# Patient Record
Sex: MALE | Race: WHITE | NOT HISPANIC OR LATINO
[De-identification: names, ages, dates, MRNs, and addresses within clinical notes are randomized per-mention and may not be internally consistent; named-entity substitution may affect disease eponyms.]

---

## 2017-11-15 ENCOUNTER — CHARTING TRANS (OUTPATIENT)
Dept: OTHER | Age: 32
End: 2017-11-15

## 2018-01-08 ENCOUNTER — LAB SERVICES (OUTPATIENT)
Dept: OTHER | Age: 33
End: 2018-01-08

## 2018-01-12 ENCOUNTER — CHARTING TRANS (OUTPATIENT)
Dept: OTHER | Age: 33
End: 2018-01-12

## 2018-01-15 LAB
MOTILITY-GRADE A: 67 %
MOTILITY-GRADE B: 4 %
MOTILITY-GRADE C: 7 %
MOTILITY-GRADE D: 22 %
PERIOD OF ABSTINENCE: ABNORMAL
PROBLEMS WITH COLLECTION: ABNORMAL
SEMEN COLOR: ABNORMAL
SEMEN CONSISTENCY (VISCOSITY): ABNORMAL
SEMEN LIQUEFACTION: ABNORMAL
SEMEN MORPHOLOGY: ABNORMAL
SEMEN PH: 8
SEMEN VOLUME: 2.5 ML
SPERM CONCENTRATION (MIL/ML): 18 MIL/ML
SPERM COUNT (MIL/EJACULATE): 45 MIL/EJACULATE
SPERM VIABILITY: ABNORMAL

## 2018-01-16 ENCOUNTER — CHARTING TRANS (OUTPATIENT)
Dept: OTHER | Age: 33
End: 2018-01-16

## 2018-01-24 ENCOUNTER — LAB SERVICES (OUTPATIENT)
Dept: OTHER | Age: 33
End: 2018-01-24

## 2018-01-25 ENCOUNTER — CHARTING TRANS (OUTPATIENT)
Dept: OTHER | Age: 33
End: 2018-01-25

## 2018-01-29 LAB
MOTILITY-GRADE A: 42 %
MOTILITY-GRADE B: 17 %
MOTILITY-GRADE C: 13 %
MOTILITY-GRADE D: 28 %
PERIOD OF ABSTINENCE: NORMAL
PROBLEMS WITH COLLECTION: NORMAL
SEMEN COLOR: NORMAL
SEMEN CONSISTENCY (VISCOSITY): NORMAL
SEMEN LIQUEFACTION: NORMAL
SEMEN MORPHOLOGY: NORMAL
SEMEN PH: 9
SEMEN VOLUME: 2.5 ML
SPERM CONCENTRATION (MIL/ML): 20.2 MIL/ML
SPERM COUNT (MIL/EJACULATE): 50.5 MIL/EJACULATE
SPERM VIABILITY: NORMAL

## 2018-03-08 ENCOUNTER — CHARTING TRANS (OUTPATIENT)
Dept: OTHER | Age: 33
End: 2018-03-08

## 2018-12-10 ENCOUNTER — DOCUMENTATION (OUTPATIENT)
Dept: OPHTHALMOLOGY | Age: 33
End: 2018-12-10

## 2019-01-26 ENCOUNTER — WALK IN (OUTPATIENT)
Dept: URGENT CARE | Age: 34
End: 2019-01-26

## 2019-01-26 ENCOUNTER — IMAGING SERVICES (OUTPATIENT)
Dept: GENERAL RADIOLOGY | Age: 34
End: 2019-01-26
Attending: FAMILY MEDICINE

## 2019-01-26 DIAGNOSIS — M54.2 NECK PAIN: Primary | ICD-10-CM

## 2019-01-26 DIAGNOSIS — M54.2 NECK PAIN: ICD-10-CM

## 2019-01-26 PROCEDURE — 72050 X-RAY EXAM NECK SPINE 4/5VWS: CPT | Performed by: RADIOLOGY

## 2019-01-26 PROCEDURE — 99204 OFFICE O/P NEW MOD 45 MIN: CPT | Performed by: FAMILY MEDICINE

## 2019-01-26 RX ORDER — FLUTICASONE PROPIONATE 50 MCG
2 SPRAY, SUSPENSION (ML) NASAL
COMMUNITY
Start: 2016-03-29

## 2019-01-26 RX ORDER — FAMOTIDINE 20 MG/1
20 TABLET, FILM COATED ORAL
COMMUNITY
Start: 2014-03-20

## 2019-01-26 RX ORDER — FEXOFENADINE HCL 180 MG/1
180 TABLET ORAL
COMMUNITY
Start: 2014-03-20

## 2019-01-26 RX ORDER — BACLOFEN 10 MG/1
10 TABLET ORAL 3 TIMES DAILY PRN
Qty: 15 TABLET | Refills: 0 | Status: SHIPPED | OUTPATIENT
Start: 2019-01-26 | End: 2019-01-31

## 2019-01-26 ASSESSMENT — PAIN SCALES - GENERAL: PAINLEVEL: 3-4

## 2019-02-05 ENCOUNTER — WALK IN (OUTPATIENT)
Dept: URGENT CARE | Age: 34
End: 2019-02-05

## 2019-02-05 DIAGNOSIS — M54.2 NECK PAIN: Primary | ICD-10-CM

## 2019-02-05 DIAGNOSIS — G44.309 POST-TRAUMATIC HEADACHE, NOT INTRACTABLE, UNSPECIFIED CHRONICITY PATTERN: ICD-10-CM

## 2019-02-05 DIAGNOSIS — R42 VERTIGO: ICD-10-CM
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
## 2019-02-05 PROCEDURE — 99204 OFFICE O/P NEW MOD 45 MIN: CPT | Performed by: PEDIATRICS

## 2019-02-05 ASSESSMENT — PAIN SCALES - GENERAL: PAINLEVEL: 7-8

## 2019-10-16 ENCOUNTER — OFFICE VISIT (OUTPATIENT)
Dept: OPTOMETRY | Age: 34
End: 2019-10-16

## 2019-10-16 DIAGNOSIS — H52.13 MYOPIA OF BOTH EYES: Primary | ICD-10-CM

## 2019-10-16 DIAGNOSIS — H52.223 REGULAR ASTIGMATISM OF BOTH EYES: ICD-10-CM

## 2019-10-16 PROCEDURE — 92015 DETERMINE REFRACTIVE STATE: CPT | Performed by: OPTOMETRIST

## 2019-10-16 PROCEDURE — 92014 COMPRE OPH EXAM EST PT 1/>: CPT | Performed by: OPTOMETRIST

## 2019-10-16 RX ORDER — ESCITALOPRAM OXALATE 10 MG/1
10 TABLET ORAL
COMMUNITY
Start: 2019-09-26

## 2019-10-16 ASSESSMENT — EXTERNAL EXAM - LEFT EYE: OS_EXAM: NORMAL

## 2019-10-16 ASSESSMENT — REFRACTION_WEARINGRX
OD_SPHERE: -6.00
OS_SPHERE: -5.25
OD_AXIS: 135
OD_CYLINDER: +1.25
OS_AXIS: 020
OS_CYLINDER: +1.50

## 2019-10-16 ASSESSMENT — EXTERNAL EXAM - RIGHT EYE: OD_EXAM: NORMAL

## 2019-10-16 ASSESSMENT — KERATOMETRY
OD_K1POWER_DIOPTERS: 43.25
OS_AXISANGLE_DEGREES: 110
OS_K1POWER_DIOPTERS: 43.12
OS_K2POWER_DIOPTERS: 41.87
OD_AXISANGLE2_DEGREES: 150
OD_AXISANGLE_DEGREES: 060
OS_AXISANGLE2_DEGREES: 020
OD_K2POWER_DIOPTERS: 42.50

## 2019-10-16 ASSESSMENT — REFRACTION_MANIFEST
OD_CYLINDER: +1.25
OD_SPHERE: -5.75
OD_AXIS: 140
OS_AXIS: 025
OS_CYLINDER: +1.75
OS_SPHERE: -5.25

## 2019-10-16 ASSESSMENT — TONOMETRY
IOP_METHOD: APPLANATION
OD_IOP_MMHG: 15
OS_IOP_MMHG: 15

## 2019-10-16 ASSESSMENT — CONF VISUAL FIELD
OD_NORMAL: 1
OS_NORMAL: 1

## 2019-10-16 ASSESSMENT — VISUAL ACUITY
CORRECTION_TYPE: GLASSES
OS_CC: J1
OD_CC: J1
OS_CC: 20/20
METHOD: SNELLEN - LINEAR
OD_CC: 20/20

## 2019-10-16 ASSESSMENT — SLIT LAMP EXAM - LIDS
COMMENTS: NORMAL
COMMENTS: NORMAL

## 2019-10-16 ASSESSMENT — CUP TO DISC RATIO
OD_RATIO: 0.6
OS_RATIO: 0.6

## 2019-10-22 ENCOUNTER — OFFICE VISIT (OUTPATIENT)
Dept: DERMATOLOGY | Age: 34
End: 2019-10-22

## 2019-10-22 DIAGNOSIS — Z12.83 SCREENING EXAM FOR SKIN CANCER: ICD-10-CM

## 2019-10-22 DIAGNOSIS — D48.5 NEOPLASM OF UNCERTAIN BEHAVIOR OF SKIN: Primary | ICD-10-CM

## 2019-10-22 PROCEDURE — 99203 OFFICE O/P NEW LOW 30 MIN: CPT | Performed by: DERMATOLOGY

## 2019-10-22 PROCEDURE — 11300 SHAVE SKIN LESION 0.5 CM/<: CPT | Performed by: DERMATOLOGY

## 2019-10-24 ENCOUNTER — TELEPHONE (OUTPATIENT)
Dept: DERMATOLOGY | Age: 34
End: 2019-10-24

## 2019-10-24 ENCOUNTER — TELEPHONE (OUTPATIENT)
Dept: SURGERY | Age: 34
End: 2019-10-24

## 2019-10-24 DIAGNOSIS — D22.9 SPITZ NEVUS: Primary | ICD-10-CM

## 2019-10-24 DIAGNOSIS — D22.5 MELANOCYTIC NEVI OF TRUNK: ICD-10-CM

## 2019-10-24 LAB — PATH REPORT PLASRBC-IMP: NORMAL

## 2019-11-06 ENCOUNTER — OFFICE VISIT (OUTPATIENT)
Dept: SURGERY | Age: 34
End: 2019-11-06

## 2019-11-06 VITALS
BODY MASS INDEX: 23.38 KG/M2 | WEIGHT: 167 LBS | SYSTOLIC BLOOD PRESSURE: 132 MMHG | DIASTOLIC BLOOD PRESSURE: 92 MMHG | TEMPERATURE: 97.3 F | HEIGHT: 71 IN

## 2019-11-06 DIAGNOSIS — D22.9 SPITZ NEVUS: Primary | ICD-10-CM

## 2019-11-06 PROCEDURE — 11601 EXC TR-EXT MAL+MARG 0.6-1 CM: CPT | Performed by: SURGERY

## 2019-11-06 PROCEDURE — 99243 OFF/OP CNSLTJ NEW/EST LOW 30: CPT | Performed by: SURGERY

## 2019-11-08 ASSESSMENT — ENCOUNTER SYMPTOMS
CONSTITUTIONAL NEGATIVE: 1
NEUROLOGICAL NEGATIVE: 1
GASTROINTESTINAL NEGATIVE: 1
ENDOCRINE NEGATIVE: 1
HEMATOLOGIC/LYMPHATIC NEGATIVE: 1
ALLERGIC/IMMUNOLOGIC NEGATIVE: 1
RESPIRATORY NEGATIVE: 1

## 2019-11-11 LAB — AP REPORT: NORMAL

## 2019-11-20 ENCOUNTER — NURSE ONLY (OUTPATIENT)
Dept: SURGERY | Age: 34
End: 2019-11-20

## 2019-11-20 DIAGNOSIS — Z48.02 VISIT FOR SUTURE REMOVAL: Primary | ICD-10-CM

## 2019-11-20 PROCEDURE — 99024 POSTOP FOLLOW-UP VISIT: CPT | Performed by: SURGERY

## 2019-11-22 ENCOUNTER — E-ADVICE (OUTPATIENT)
Dept: OPTOMETRY | Age: 34
End: 2019-11-22

## 2019-12-04 ENCOUNTER — OFFICE VISIT (OUTPATIENT)
Dept: OPTOMETRY | Age: 34
End: 2019-12-04

## 2019-12-04 DIAGNOSIS — H52.13 MYOPIA OF BOTH EYES: Primary | ICD-10-CM

## 2019-12-04 DIAGNOSIS — H52.223 REGULAR ASTIGMATISM OF BOTH EYES: ICD-10-CM

## 2019-12-04 PROCEDURE — X1094 NO CHARGE VISIT: HCPCS | Performed by: OPTOMETRIST

## 2019-12-04 ASSESSMENT — VISUAL ACUITY
METHOD: SNELLEN - LINEAR
OS_CC+: -2
OD_CC: 20/20
CORRECTION_TYPE: GLASSES
OS_CC: 20/20
OD_CC: J1@16
OD_CC+: -2
OS_CC: J1@16

## 2019-12-04 ASSESSMENT — CONF VISUAL FIELD
OD_NORMAL: 1
OS_NORMAL: 1

## 2019-12-04 ASSESSMENT — REFRACTION_MANIFEST
OS_CYLINDER: +1.50
OD_SPHERE: -5.75
OS_SPHERE: -5.25
OD_AXIS: 140
OD_CYLINDER: +1.25
OS_AXIS: 025

## 2019-12-04 ASSESSMENT — REFRACTION_WEARINGRX
OS_CYLINDER: +1.75
OD_AXIS: 140
OD_SPHERE: -5.75
OS_SPHERE: -5.25
OS_AXIS: 025
OD_CYLINDER: +1.25

## 2019-12-05 ENCOUNTER — E-ADVICE (OUTPATIENT)
Dept: OPTOMETRY | Age: 34
End: 2019-12-05

## 2020-02-26 ENCOUNTER — HOSPITAL ENCOUNTER (OUTPATIENT)
Age: 35
Discharge: HOME | End: 2020-02-26
Attending: FAMILY MEDICINE
Payer: COMMERCIAL

## 2020-02-26 ENCOUNTER — APPOINTMENT (OUTPATIENT)
Dept: CT IMAGING | Age: 35
End: 2020-02-26
Attending: FAMILY MEDICINE
Payer: COMMERCIAL

## 2020-02-26 VITALS
TEMPERATURE: 100 F | OXYGEN SATURATION: 97 % | HEART RATE: 99 BPM | SYSTOLIC BLOOD PRESSURE: 140 MMHG | RESPIRATION RATE: 16 BRPM | DIASTOLIC BLOOD PRESSURE: 80 MMHG

## 2020-02-26 DIAGNOSIS — E86.0 DEHYDRATION: Primary | ICD-10-CM

## 2020-02-26 DIAGNOSIS — K52.9 GASTROENTERITIS: ICD-10-CM

## 2020-02-26 LAB
HCT VFR BLD AUTO: 48.1 % (ref 39–53)
HGB BLD-MCNC: 16.6 G/DL (ref 13–17.5)
MCH RBC QN AUTO: 30.2 PG (ref 26–34)
MCHC RBC AUTO-ENTMCNC: 34.5 G/DL (ref 31–37)
MCV RBC AUTO: 87.5 FL (ref 80–100)
PLATELET # BLD AUTO: 222 X10ˆ3/UL (ref 150–450)
POCT BILIRUBIN URINE: NEGATIVE
POCT GLUCOSE URINE: NEGATIVE MG/DL
POCT KETONE URINE: 15 MG/DL
POCT LEUKOCYTE ESTERASE URINE: NEGATIVE
POCT NITRITE URINE: NEGATIVE
POCT PH URINE: 5.5 (ref 5–8)
POCT PROTEIN URINE: NEGATIVE MG/DL
POCT SPECIFIC GRAVITY URINE: 1.03
POCT URINE CLARITY: CLEAR
POCT UROBILINOGEN URINE: 0.2 MG/DL
RBC # BLD AUTO: 5.5 X10ˆ6/UL (ref 4.3–5.7)

## 2020-02-26 PROCEDURE — 81002 URINALYSIS NONAUTO W/O SCOPE: CPT | Performed by: FAMILY MEDICINE

## 2020-02-26 PROCEDURE — 85025 COMPLETE CBC W/AUTO DIFF WBC: CPT | Performed by: FAMILY MEDICINE

## 2020-02-26 PROCEDURE — 96361 HYDRATE IV INFUSION ADD-ON: CPT

## 2020-02-26 PROCEDURE — 96374 THER/PROPH/DIAG INJ IV PUSH: CPT

## 2020-02-26 PROCEDURE — 99205 OFFICE O/P NEW HI 60 MIN: CPT

## 2020-02-26 PROCEDURE — 74176 CT ABD & PELVIS W/O CONTRAST: CPT | Performed by: FAMILY MEDICINE

## 2020-02-26 PROCEDURE — 99204 OFFICE O/P NEW MOD 45 MIN: CPT

## 2020-02-26 RX ORDER — ONDANSETRON 2 MG/ML
4 INJECTION INTRAMUSCULAR; INTRAVENOUS ONCE
Status: COMPLETED | OUTPATIENT
Start: 2020-02-26 | End: 2020-02-26

## 2020-02-26 RX ORDER — ACETAMINOPHEN 500 MG
1000 TABLET ORAL ONCE
Status: COMPLETED | OUTPATIENT
Start: 2020-02-26 | End: 2020-02-26

## 2020-02-26 RX ORDER — ONDANSETRON 4 MG/1
4 TABLET, ORALLY DISINTEGRATING ORAL EVERY 4 HOURS PRN
Qty: 10 TABLET | Refills: 0 | Status: SHIPPED | OUTPATIENT
Start: 2020-02-26 | End: 2020-03-04

## 2020-02-26 RX ORDER — SODIUM CHLORIDE 9 MG/ML
1000 INJECTION, SOLUTION INTRAVENOUS ONCE
Status: COMPLETED | OUTPATIENT
Start: 2020-02-26 | End: 2020-02-26

## 2020-02-26 RX ORDER — DICYCLOMINE HCL 20 MG
20 TABLET ORAL 4 TIMES DAILY PRN
Qty: 30 TABLET | Refills: 0 | Status: SHIPPED | OUTPATIENT
Start: 2020-02-26 | End: 2020-03-27

## 2020-02-26 NOTE — ED INITIAL ASSESSMENT (HPI)
Pt sts this morning began with mid abd cramping, n/v. Unable to keep any fluids down today. Emesis x 4. Denies diarrhea.

## 2020-02-26 NOTE — ED PROVIDER NOTES
Patient Seen in: [id number] [facility]      History   Patient presents with:  Nausea/Vomiting/Diarrhea  Abdomen/Flank Pain    Stated Complaint: Vomiting,Unable to Drink    HPI    37-year-old male with history of anxiety, depression, and GERD pr sinus tenderness on palpation. Ears: Normal external ear exam. Bilateral TMs are clear. Nose: Bilateral nares are clear. Mouth: Dry cracked lips with dry tongue. Posterior pharynx is clear. No erythema. No exudate. Uvula is midline.   Neck: Supple, NT, and descending colon with a small amount of surrounding soft tissue stranding consistent with colitis. Normal caliber appendix. Scattered fluid-filled small bowel loops without evidence of small bowel obstruction. 1832-nausea has resolved.   Carla

## 2020-11-05 ENCOUNTER — WALK IN (OUTPATIENT)
Dept: URGENT CARE | Age: 35
End: 2020-11-05

## 2020-11-05 VITALS
HEART RATE: 112 BPM | DIASTOLIC BLOOD PRESSURE: 94 MMHG | RESPIRATION RATE: 20 BRPM | TEMPERATURE: 100.2 F | OXYGEN SATURATION: 97 % | SYSTOLIC BLOOD PRESSURE: 143 MMHG

## 2020-11-05 DIAGNOSIS — Z20.822 SUSPECTED COVID-19 VIRUS INFECTION: Primary | ICD-10-CM

## 2020-11-05 DIAGNOSIS — J06.9 ACUTE UPPER RESPIRATORY INFECTION, UNSPECIFIED: ICD-10-CM

## 2020-11-05 LAB — SARS-COV-2 AG RESP QL IA.RAPID: NOT DETECTED

## 2020-11-05 PROCEDURE — 87426 SARSCOV CORONAVIRUS AG IA: CPT | Performed by: INTERNAL MEDICINE

## 2020-11-05 PROCEDURE — 99214 OFFICE O/P EST MOD 30 MIN: CPT | Performed by: INTERNAL MEDICINE

## 2020-12-31 ENCOUNTER — OFFICE VISIT (OUTPATIENT)
Dept: OPTOMETRY | Age: 35
End: 2020-12-31

## 2020-12-31 DIAGNOSIS — H52.13 MYOPIA OF BOTH EYES: Primary | ICD-10-CM

## 2020-12-31 DIAGNOSIS — H52.223 REGULAR ASTIGMATISM OF BOTH EYES: ICD-10-CM

## 2020-12-31 PROCEDURE — 92014 COMPRE OPH EXAM EST PT 1/>: CPT | Performed by: OPTOMETRIST

## 2020-12-31 PROCEDURE — 92015 DETERMINE REFRACTIVE STATE: CPT | Performed by: OPTOMETRIST

## 2020-12-31 ASSESSMENT — VISUAL ACUITY
CORRECTION_TYPE: GLASSES
OS_CC: 20/20
OD_CC: 20/20
METHOD: SNELLEN - LINEAR
OS_CC+: -1
OD_CC: J1@16""
OD_CC+: -3

## 2020-12-31 ASSESSMENT — REFRACTION_MANIFEST
OS_CYLINDER: +1.75
OS_SPHERE: -5.25
OD_CYLINDER: +1.25
OD_AXIS: 140
OS_AXIS: 025
OD_SPHERE: -5.75

## 2020-12-31 ASSESSMENT — CUP TO DISC RATIO
OD_RATIO: 0.6
OS_RATIO: 0.6

## 2020-12-31 ASSESSMENT — TONOMETRY
IOP_METHOD: APPLANATION
OD_IOP_MMHG: 12
OS_IOP_MMHG: 12

## 2020-12-31 ASSESSMENT — REFRACTION_WEARINGRX
OD_AXIS: 139
OS_SPHERE: -5.25
OD_CYLINDER: +1.25
OS_AXIS: 024
OD_SPHERE: -5.75
OS_CYLINDER: +1.75

## 2020-12-31 ASSESSMENT — EXTERNAL EXAM - RIGHT EYE: OD_EXAM: NORMAL

## 2020-12-31 ASSESSMENT — CONF VISUAL FIELD
OS_NORMAL: 1
OD_NORMAL: 1

## 2020-12-31 ASSESSMENT — SLIT LAMP EXAM - LIDS
COMMENTS: NORMAL
COMMENTS: NORMAL

## 2020-12-31 ASSESSMENT — EXTERNAL EXAM - LEFT EYE: OS_EXAM: NORMAL

## 2021-04-15 ENCOUNTER — OFFICE VISIT (OUTPATIENT)
Dept: DERMATOLOGY | Age: 36
End: 2021-04-15

## 2021-04-15 DIAGNOSIS — D18.01 CHERRY ANGIOMA: Primary | ICD-10-CM

## 2021-04-15 DIAGNOSIS — Z12.83 SCREENING EXAM FOR SKIN CANCER: ICD-10-CM

## 2021-04-15 PROCEDURE — 99212 OFFICE O/P EST SF 10 MIN: CPT | Performed by: DERMATOLOGY

## 2021-05-25 VITALS
SYSTOLIC BLOOD PRESSURE: 134 MMHG | HEART RATE: 68 BPM | OXYGEN SATURATION: 97 % | TEMPERATURE: 98.4 F | RESPIRATION RATE: 16 BRPM | DIASTOLIC BLOOD PRESSURE: 81 MMHG | RESPIRATION RATE: 16 BRPM | HEART RATE: 89 BPM | SYSTOLIC BLOOD PRESSURE: 131 MMHG | OXYGEN SATURATION: 97 % | TEMPERATURE: 97.8 F | DIASTOLIC BLOOD PRESSURE: 74 MMHG

## 2021-12-01 ENCOUNTER — OFFICE VISIT (OUTPATIENT)
Dept: OPTOMETRY | Age: 36
End: 2021-12-01

## 2021-12-01 DIAGNOSIS — H52.223 REGULAR ASTIGMATISM OF BOTH EYES: ICD-10-CM

## 2021-12-01 DIAGNOSIS — H52.13 MYOPIA OF BOTH EYES: Primary | ICD-10-CM

## 2021-12-01 PROCEDURE — 92015 DETERMINE REFRACTIVE STATE: CPT | Performed by: OPTOMETRIST

## 2021-12-01 PROCEDURE — 92012 INTRM OPH EXAM EST PATIENT: CPT | Performed by: OPTOMETRIST

## 2021-12-01 ASSESSMENT — REFRACTION_WEARINGRX
OD_CYLINDER: +1.25
OD_SPHERE: -5.75
OS_AXIS: 023
OD_AXIS: 135
OS_SPHERE: -5.25
OS_CYLINDER: +1.75

## 2021-12-01 ASSESSMENT — VISUAL ACUITY
OD_CC: 20/20
OS_CC: 20/25
METHOD: SNELLEN - LINEAR
OS_CC: JAEGER 1
OD_CC: JAEGER 1
CORRECTION_TYPE: GLASSES

## 2021-12-01 ASSESSMENT — CONF VISUAL FIELD
OD_NORMAL: 1
OS_NORMAL: 1

## 2021-12-01 ASSESSMENT — REFRACTION_MANIFEST
OD_SPHERE: -6.00
OD_CYLINDER: +1.50
OD_AXIS: 140
OS_CYLINDER: +1.75
OS_AXIS: 023
OS_SPHERE: -5.25

## 2021-12-01 ASSESSMENT — TONOMETRY
OD_IOP_MMHG: 14
OS_IOP_MMHG: 14
IOP_METHOD: APPLANATION

## 2021-12-02 ASSESSMENT — KERATOMETRY
OS_K1POWER_DIOPTERS: 42.75
OD_AXISANGLE2_DEGREES: 180
OD_K1POWER_DIOPTERS: 42.75
OS_AXISANGLE2_DEGREES: 180
OD_K2POWER_DIOPTERS: 42.75
OS_K2POWER_DIOPTERS: 42.75
OS_AXISANGLE_DEGREES: 090
OD_AXISANGLE_DEGREES: 090

## 2021-12-02 ASSESSMENT — SLIT LAMP EXAM - LIDS
COMMENTS: NORMAL
COMMENTS: NORMAL

## 2021-12-02 ASSESSMENT — EXTERNAL EXAM - LEFT EYE: OS_EXAM: NORMAL

## 2021-12-02 ASSESSMENT — EXTERNAL EXAM - RIGHT EYE: OD_EXAM: NORMAL

## 2021-12-02 ASSESSMENT — CUP TO DISC RATIO
OD_RATIO: 0.6
OS_RATIO: 0.6

## 2022-10-25 ENCOUNTER — HOSPITAL ENCOUNTER (OUTPATIENT)
Age: 37
Discharge: HOME | End: 2022-10-25
Payer: COMMERCIAL

## 2022-10-25 VITALS
OXYGEN SATURATION: 97 % | TEMPERATURE: 98 F | SYSTOLIC BLOOD PRESSURE: 150 MMHG | HEART RATE: 85 BPM | RESPIRATION RATE: 16 BRPM | DIASTOLIC BLOOD PRESSURE: 81 MMHG

## 2022-10-25 DIAGNOSIS — R68.89 FLU-LIKE SYMPTOMS: Primary | ICD-10-CM

## 2022-10-25 LAB — SARS-COV-2 RNA RESP QL NAA+PROBE: NOT DETECTED

## 2022-10-25 PROCEDURE — 99203 OFFICE O/P NEW LOW 30 MIN: CPT | Performed by: PHYSICIAN ASSISTANT

## 2022-10-25 PROCEDURE — U0002 COVID-19 LAB TEST NON-CDC: HCPCS | Performed by: PHYSICIAN ASSISTANT

## 2022-10-25 RX ORDER — BENZONATATE 200 MG/1
200 CAPSULE ORAL 3 TIMES DAILY PRN
Qty: 30 CAPSULE | Refills: 0 | Status: SHIPPED | OUTPATIENT
Start: 2022-10-25 | End: 2022-11-24

## 2022-12-01 ENCOUNTER — TELEPHONE (OUTPATIENT)
Dept: OPHTHALMOLOGY | Age: 37
End: 2022-12-01

## 2022-12-06 ENCOUNTER — OFFICE VISIT (OUTPATIENT)
Dept: OPHTHALMOLOGY | Age: 37
End: 2022-12-06

## 2022-12-06 ENCOUNTER — OFFICE VISIT (OUTPATIENT)
Dept: OPTOMETRY | Age: 37
End: 2022-12-06

## 2022-12-06 DIAGNOSIS — H52.13 MYOPIA OF BOTH EYES: ICD-10-CM

## 2022-12-06 DIAGNOSIS — H52.221 REGULAR ASTIGMATISM OF RIGHT EYE: ICD-10-CM

## 2022-12-06 DIAGNOSIS — H52.13 MYOPIA OF BOTH EYES: Primary | ICD-10-CM

## 2022-12-06 PROCEDURE — 92014 COMPRE OPH EXAM EST PT 1/>: CPT | Performed by: OPTOMETRIST

## 2022-12-06 PROCEDURE — X92250 OPTOMAP: Performed by: OPTOMETRIST

## 2022-12-06 PROCEDURE — 92015 DETERMINE REFRACTIVE STATE: CPT | Performed by: OPTOMETRIST

## 2022-12-06 ASSESSMENT — KERATOMETRY
OD_K1POWER_DIOPTERS: 43.75
OS_K2POWER_DIOPTERS: 42.50
OS_AXISANGLE_DEGREES: 110
OS_K1POWER_DIOPTERS: 44.00
OD_AXISANGLE_DEGREES: 090
OD_K2POWER_DIOPTERS: 43.50
OD_AXISANGLE2_DEGREES: 180
OS_AXISANGLE2_DEGREES: 020

## 2022-12-06 ASSESSMENT — REFRACTION_MANIFEST
OS_AXIS: 023
OD_CYLINDER: +1.50
OS_SPHERE: -5.25
OD_SPHERE: -6.00
OD_AXIS: 140
OS_CYLINDER: +1.75

## 2022-12-06 ASSESSMENT — VISUAL ACUITY
OS_CC: 20/20
OD_CC+: -1
OD_CC: 20/20
CORRECTION_TYPE: GLASSES
METHOD: SNELLEN - LINEAR
OD_CC: JAEGER 1+

## 2022-12-06 ASSESSMENT — CUP TO DISC RATIO
OS_RATIO: 0.6
OD_RATIO: 0.6

## 2022-12-06 ASSESSMENT — REFRACTION_WEARINGRX
OS_AXIS: 023
OD_AXIS: 135
OD_SPHERE: -5.75
OS_CYLINDER: +1.75
OD_CYLINDER: +1.25
OS_SPHERE: -5.25

## 2022-12-06 ASSESSMENT — SLIT LAMP EXAM - LIDS
COMMENTS: NORMAL
COMMENTS: NORMAL

## 2022-12-06 ASSESSMENT — CONF VISUAL FIELD
OS_NORMAL: 1
OD_NORMAL: 1

## 2022-12-06 ASSESSMENT — TONOMETRY
OD_IOP_MMHG: 13
OS_IOP_MMHG: 14

## 2022-12-06 ASSESSMENT — EXTERNAL EXAM - RIGHT EYE: OD_EXAM: NORMAL

## 2022-12-06 ASSESSMENT — EXTERNAL EXAM - LEFT EYE: OS_EXAM: NORMAL

## 2022-12-13 ENCOUNTER — TELEPHONE (OUTPATIENT)
Dept: OPHTHALMOLOGY | Age: 37
End: 2022-12-13

## 2023-01-31 ENCOUNTER — OFFICE VISIT (OUTPATIENT)
Dept: DERMATOLOGY | Age: 38
End: 2023-01-31

## 2023-01-31 DIAGNOSIS — Z12.83 SCREENING EXAM FOR SKIN CANCER: ICD-10-CM

## 2023-01-31 DIAGNOSIS — L81.4 SOLAR LENTIGO: Primary | ICD-10-CM

## 2023-01-31 PROCEDURE — 99212 OFFICE O/P EST SF 10 MIN: CPT | Performed by: DERMATOLOGY

## 2023-12-20 ENCOUNTER — APPOINTMENT (OUTPATIENT)
Dept: OPTOMETRY | Age: 38
End: 2023-12-20

## 2023-12-20 DIAGNOSIS — H52.13 MYOPIA OF BOTH EYES: Primary | ICD-10-CM

## 2023-12-20 DIAGNOSIS — H52.223 REGULAR ASTIGMATISM OF BOTH EYES: ICD-10-CM

## 2023-12-20 PROCEDURE — 92015 DETERMINE REFRACTIVE STATE: CPT | Performed by: OPTOMETRIST

## 2023-12-20 PROCEDURE — 92014 COMPRE OPH EXAM EST PT 1/>: CPT | Performed by: OPTOMETRIST

## 2023-12-20 ASSESSMENT — VISUAL ACUITY
OD_CC: JAEGER 1
CORRECTION_TYPE: GLASSES
OS_CC+: -1
OS_CC: 20/25
OD_CC: 20/30
METHOD: SNELLEN - LINEAR
OD_CC+: +3

## 2023-12-20 ASSESSMENT — CONF VISUAL FIELD
OD_SUPERIOR_TEMPORAL_RESTRICTION: 0
OD_SUPERIOR_NASAL_RESTRICTION: 0
OS_INFERIOR_TEMPORAL_RESTRICTION: 0
OS_SUPERIOR_TEMPORAL_RESTRICTION: 0
OD_INFERIOR_TEMPORAL_RESTRICTION: 0
OS_SUPERIOR_NASAL_RESTRICTION: 0
OS_INFERIOR_NASAL_RESTRICTION: 0
OS_NORMAL: 1
OD_INFERIOR_NASAL_RESTRICTION: 0
OD_NORMAL: 1

## 2023-12-20 ASSESSMENT — REFRACTION_MANIFEST
OS_CYLINDER: +1.75
OS_AXIS: 021
OS_SPHERE: -5.25
OD_AXIS: 140
OD_CYLINDER: +1.50
OD_SPHERE: -5.75

## 2023-12-20 ASSESSMENT — REFRACTION_WEARINGRX
SPECS_TYPE: DISTANCE SINGLE VISION
OS_AXIS: 021
OD_CYLINDER: +1.50
OS_SPHERE: -5.25
OS_CYLINDER: +1.75
OD_AXIS: 140
OD_SPHERE: -6.00

## 2023-12-20 ASSESSMENT — TONOMETRY
OD_IOP_MMHG: 19
OS_IOP_MMHG: 17

## 2023-12-20 ASSESSMENT — KERATOMETRY
OS_K2POWER_DIOPTERS: 41.50
OS_AXISANGLE_DEGREES: 090
OS_K1POWER_DIOPTERS: 43.25
OD_K2POWER_DIOPTERS: 42.00
OD_K1POWER_DIOPTERS: 42.25
OS_AXISANGLE2_DEGREES: 180
OD_AXISANGLE_DEGREES: 09
OD_AXISANGLE2_DEGREES: 180

## 2023-12-20 ASSESSMENT — SLIT LAMP EXAM - LIDS
COMMENTS: NORMAL
COMMENTS: NORMAL

## 2023-12-20 ASSESSMENT — CUP TO DISC RATIO: OD_RATIO: 0.6

## 2023-12-20 ASSESSMENT — EXTERNAL EXAM - RIGHT EYE: OD_EXAM: NORMAL

## 2023-12-20 ASSESSMENT — EXTERNAL EXAM - LEFT EYE: OS_EXAM: NORMAL

## 2024-12-17 ENCOUNTER — APPOINTMENT (OUTPATIENT)
Dept: OPTOMETRY | Age: 39
End: 2024-12-17

## 2024-12-17 DIAGNOSIS — H52.223 REGULAR ASTIGMATISM OF BOTH EYES: ICD-10-CM

## 2024-12-17 DIAGNOSIS — H43.819: ICD-10-CM

## 2024-12-17 DIAGNOSIS — H52.13 MYOPIA OF BOTH EYES: Primary | ICD-10-CM

## 2024-12-17 DIAGNOSIS — H40.003 OPEN ANGLE WITH BORDERLINE GLAUCOMA FINDINGS, BILATERAL: ICD-10-CM

## 2024-12-17 PROCEDURE — 92014 COMPRE OPH EXAM EST PT 1/>: CPT | Performed by: OPTOMETRIST

## 2024-12-17 PROCEDURE — 92015 DETERMINE REFRACTIVE STATE: CPT | Performed by: OPTOMETRIST

## 2024-12-17 ASSESSMENT — CONF VISUAL FIELD
OS_INFERIOR_TEMPORAL_RESTRICTION: 0
OS_NORMAL: 1
OD_INFERIOR_NASAL_RESTRICTION: 0
OD_INFERIOR_TEMPORAL_RESTRICTION: 0
OD_SUPERIOR_TEMPORAL_RESTRICTION: 0
OS_INFERIOR_NASAL_RESTRICTION: 0
OD_SUPERIOR_NASAL_RESTRICTION: 0
OS_SUPERIOR_NASAL_RESTRICTION: 0
OD_NORMAL: 1
OS_SUPERIOR_TEMPORAL_RESTRICTION: 0

## 2024-12-17 ASSESSMENT — KERATOMETRY
OS_K2POWER_DIOPTERS: 42.37
OS_AXISANGLE_DEGREES: 090
OD_AXISANGLE_DEGREES: 090
OS_K1POWER_DIOPTERS: 42.50
OD_K1POWER_DIOPTERS: 42.50
OD_K2POWER_DIOPTERS: 42.37
OD_AXISANGLE2_DEGREES: 180
OS_AXISANGLE2_DEGREES: 180

## 2024-12-17 ASSESSMENT — SLIT LAMP EXAM - LIDS
COMMENTS: NORMAL
COMMENTS: NORMAL

## 2024-12-17 ASSESSMENT — REFRACTION_MANIFEST
OD_SPHERE: -6.00
OS_SPHERE: -5.25
OD_AXIS: 140
OS_CYLINDER: +1.50
OD_CYLINDER: +1.50
OS_AXIS: 020

## 2024-12-17 ASSESSMENT — CUP TO DISC RATIO
OD_RATIO: 0.6
OS_RATIO: 0.6

## 2024-12-17 ASSESSMENT — REFRACTION_WEARINGRX
OD_AXIS: 142
OD_CYLINDER: +1.50
OS_SPHERE: -5.00
OS_CYLINDER: +1.50
SPECS_TYPE: DISTANCE SINGLE VISION
OD_SPHERE: -6.00
OS_AXIS: 021

## 2024-12-17 ASSESSMENT — TONOMETRY
OS_IOP_MMHG: 13
OD_IOP_MMHG: 16

## 2024-12-17 ASSESSMENT — VISUAL ACUITY
CORRECTION_TYPE: GLASSES
OS_CC: 20/20
OD_CC: JAEGER 1+
OD_CC+: -1
OS_CC+: -2
OD_CC: 20/25
METHOD: SNELLEN - LINEAR

## 2024-12-17 ASSESSMENT — EXTERNAL EXAM - RIGHT EYE: OD_EXAM: NORMAL

## 2024-12-17 ASSESSMENT — EXTERNAL EXAM - LEFT EYE: OS_EXAM: NORMAL

## 2025-01-21 ENCOUNTER — APPOINTMENT (OUTPATIENT)
Dept: DERMATOLOGY | Age: 40
End: 2025-01-21

## 2025-01-21 DIAGNOSIS — Z12.83 SCREENING EXAM FOR SKIN CANCER: ICD-10-CM

## 2025-01-21 DIAGNOSIS — D23.9 BENIGN NEOPLASM OF SKIN, UNSPECIFIED LOCATION: Primary | ICD-10-CM

## 2025-01-21 DIAGNOSIS — D18.01 CHERRY ANGIOMA: ICD-10-CM

## 2025-01-21 PROCEDURE — 99213 OFFICE O/P EST LOW 20 MIN: CPT | Performed by: DERMATOLOGY

## 2025-01-21 RX ORDER — ESCITALOPRAM OXALATE 5 MG/1
TABLET ORAL
COMMUNITY
Start: 2024-12-10

## 2025-02-22 ENCOUNTER — HOSPITAL ENCOUNTER (OUTPATIENT)
Age: 40
Discharge: HOME | End: 2025-02-22
Payer: COMMERCIAL

## 2025-02-22 ENCOUNTER — APPOINTMENT (OUTPATIENT)
Dept: GENERAL RADIOLOGY | Age: 40
End: 2025-02-22
Attending: NURSE PRACTITIONER
Payer: COMMERCIAL

## 2025-02-22 VITALS
RESPIRATION RATE: 18 BRPM | TEMPERATURE: 98 F | OXYGEN SATURATION: 99 % | SYSTOLIC BLOOD PRESSURE: 169 MMHG | DIASTOLIC BLOOD PRESSURE: 99 MMHG | HEART RATE: 84 BPM
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
## 2025-02-22 DIAGNOSIS — M54.2 NECK PAIN: Primary | ICD-10-CM

## 2025-02-22 DIAGNOSIS — S16.1XXA STRAIN OF NECK MUSCLE, INITIAL ENCOUNTER: ICD-10-CM

## 2025-02-22 PROCEDURE — 72050 X-RAY EXAM NECK SPINE 4/5VWS: CPT | Performed by: NURSE PRACTITIONER

## 2025-02-22 PROCEDURE — 99213 OFFICE O/P EST LOW 20 MIN: CPT | Performed by: NURSE PRACTITIONER

## 2025-02-22 RX ORDER — ORPHENADRINE CITRATE 100 MG/1
100 TABLET ORAL 2 TIMES DAILY PRN
Qty: 20 TABLET | Refills: 0 | Status: SHIPPED | OUTPATIENT
Start: 2025-02-22 | End: 2025-03-01
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
## 2025-02-22 NOTE — DISCHARGE INSTRUCTIONS
Over-the-counter Tylenol/Motrin as needed for pain.  Use the orphenadrine muscle relaxer for pain not relieved by Motrin/Tylenol.  Orphenadrine is a muscle relaxer, that may have a common side effect of drowsiness so take precautions while on this.   Peripheral vascular disease

## 2025-12-19 ENCOUNTER — APPOINTMENT (OUTPATIENT)
Dept: OPTOMETRY | Age: 40
End: 2025-12-19